# Patient Record
Sex: MALE | Race: BLACK OR AFRICAN AMERICAN | NOT HISPANIC OR LATINO | Employment: OTHER | URBAN - METROPOLITAN AREA
[De-identification: names, ages, dates, MRNs, and addresses within clinical notes are randomized per-mention and may not be internally consistent; named-entity substitution may affect disease eponyms.]

---

## 2018-01-15 ENCOUNTER — HOSPITAL ENCOUNTER (EMERGENCY)
Facility: OTHER | Age: 73
Discharge: HOME | End: 2018-01-15
Attending: EMERGENCY MEDICINE
Payer: MEDICARE

## 2018-01-15 VITALS
DIASTOLIC BLOOD PRESSURE: 89 MMHG | OXYGEN SATURATION: 99 % | TEMPERATURE: 98 F | WEIGHT: 165 LBS | RESPIRATION RATE: 18 BRPM | HEART RATE: 100 BPM | SYSTOLIC BLOOD PRESSURE: 142 MMHG | BODY MASS INDEX: 24.44 KG/M2 | HEIGHT: 69 IN

## 2018-01-15 DIAGNOSIS — L29.9 PRURITIC CONDITION: Primary | ICD-10-CM

## 2018-01-15 PROCEDURE — 99282 EMERGENCY DEPT VISIT SF MDM: CPT

## 2018-01-15 NOTE — ED PROVIDER NOTES
"Encounter Date: 1/15/2018       History     Chief Complaint   Patient presents with    Pruritis     intermittent x3 weeks, involves head and upper back     72-year-old male with hypertension and history of chronic neck and back pain presents emergency department with complaints of a "burning itching feeling from my head to my neck to my back when a cough."  He states that the symptoms occur intermittently for the last 3 weeks.  He denies rash, chest tightness, shortness breath, chest pain, numbness or weakness.  He states that the symptoms only occur with coughing and admits that it doesn't occur every time he coughs.  He states that they last for approximately 5 seconds.  No current treatment at home.  He does report that he had changed his pathologic but admits that he stopped using it is still experiencing the symptoms.      The history is provided by the patient.     Review of patient's allergies indicates:  No Known Allergies  Past Medical History:   Diagnosis Date    Back injury     Neck injury      No past surgical history on file.  No family history on file.  Social History   Substance Use Topics    Smoking status: Not on file    Smokeless tobacco: Not on file    Alcohol use Not on file     Review of Systems   Constitutional: Negative for chills and fever.        Burning, itching pain with cough   HENT: Negative for sore throat.    Respiratory: Negative for shortness of breath.    Cardiovascular: Negative for chest pain.   Gastrointestinal: Negative for nausea and vomiting.   Genitourinary: Negative for dysuria.   Musculoskeletal: Negative for arthralgias, back pain, gait problem, myalgias, neck pain and neck stiffness.   Skin: Negative for rash.   Neurological: Negative for dizziness, syncope, weakness, light-headedness, numbness and headaches.   Hematological: Does not bruise/bleed easily.       Physical Exam     Initial Vitals   BP Pulse Resp Temp SpO2   01/15/18 0841 01/15/18 0841 01/15/18 0841 " 01/15/18 0841 01/15/18 1011   (!) 168/91 110 16 98.2 °F (36.8 °C) 99 %      MAP       01/15/18 0841       116.67         Physical Exam    Nursing note and vitals reviewed.  Constitutional: He appears well-developed and well-nourished. He is not diaphoretic.  Non-toxic appearance. No distress.   HENT:   Head: Normocephalic and atraumatic.   Right Ear: External ear normal.   Left Ear: External ear normal.   Nose: Nose normal.   Mouth/Throat: Uvula is midline, oropharynx is clear and moist and mucous membranes are normal. Mucous membranes are not dry. He has dentures. No trismus in the jaw. No uvula swelling. No oropharyngeal exudate, posterior oropharyngeal edema, posterior oropharyngeal erythema or tonsillar abscesses.   Eyes: Conjunctivae, EOM and lids are normal. Pupils are equal, round, and reactive to light. No scleral icterus.   Neck: Normal range of motion and phonation normal. Neck supple.   Cardiovascular: Normal rate, regular rhythm, normal heart sounds, intact distal pulses and normal pulses. Exam reveals no gallop, no friction rub and no decreased pulses.    No murmur heard.  Pulmonary/Chest: Effort normal and breath sounds normal. No respiratory distress. He has no decreased breath sounds. He has no wheezes. He has no rhonchi. He has no rales.   Abdominal: Normal appearance.   Musculoskeletal: Normal range of motion.   No obvious deformities, moving all extremities, normal gait   Neurological: He is alert and oriented to person, place, and time. He has normal strength and normal reflexes. He displays no atrophy. No sensory deficit. He exhibits normal muscle tone. Coordination and gait normal. GCS eye subscore is 4. GCS verbal subscore is 5. GCS motor subscore is 6.   Skin: Skin is warm, dry and intact. Capillary refill takes less than 2 seconds. No lesion and no rash noted. No erythema.   Psychiatric: He has a normal mood and affect. His speech is normal and behavior is normal. Judgment normal. Cognition  and memory are normal.         ED Course   Procedures  Labs Reviewed - No data to display          Medical Decision Making:   History:   Old Medical Records: I decided to obtain old medical records.  Initial Assessment:   72-year-old male with complaints consistent with burning itching sensation after coughing.  Afebrile and neurovascularly intact.  He is alert, healthy and nontoxic appearing.  He is in no apparent distress.  No focal neurological deficits.  Exam is completely benign.  Unsure of why patient is having the symptoms.  He has no evidence of rash.  No evidence of anaphylaxis or severe ALLERGIC reaction.  No neuro deficits.  ED Management:  I do not feel that emergent workup is indicated.  He is to follow-up with his primary care physician the next 48 hours or return for any worsening symptoms.  He states understanding.  This patient was discussed with the attending physician who agrees with treatment plan.  Other:   I have discussed this case with another health care provider.       <> Summary of the Discussion: Cuong  This note was created using Dragon Medical dictation.  There may be typographical errors secondary to dictation.                     ED Course      Clinical Impression:     1. Pruritic condition          Disposition:   Disposition: Discharged  Condition: Stable                        Evie Matthew PA-C  01/15/18 1024

## 2018-01-15 NOTE — DISCHARGE INSTRUCTIONS
I do not feel that the symptoms  that you are explaining require emergent workup at this time. East follow-up with your primary care physician or return if symptoms persist for longer than 5 seconds after coughing, he developed a rash, fever, chest tightness, shortness breath, numbness or weakness

## 2024-01-11 ENCOUNTER — OFFICE VISIT (OUTPATIENT)
Dept: ORTHOPEDICS | Facility: CLINIC | Age: 79
End: 2024-01-11
Payer: MEDICARE

## 2024-01-11 VITALS — BODY MASS INDEX: 25.92 KG/M2 | HEIGHT: 69 IN | WEIGHT: 175 LBS

## 2024-01-11 DIAGNOSIS — M43.16 SPONDYLOLISTHESIS OF LUMBAR REGION: ICD-10-CM

## 2024-01-11 DIAGNOSIS — M47.816 FACET ARTHRITIS, DEGENERATIVE, LUMBAR SPINE: ICD-10-CM

## 2024-01-11 DIAGNOSIS — M51.36 DEGENERATION OF LUMBAR INTERVERTEBRAL DISC: ICD-10-CM

## 2024-01-11 DIAGNOSIS — M17.12 PRIMARY OSTEOARTHRITIS OF LEFT KNEE: Primary | ICD-10-CM

## 2024-01-11 PROCEDURE — 99203 OFFICE O/P NEW LOW 30 MIN: CPT | Mod: S$GLB,,, | Performed by: PHYSICIAN ASSISTANT

## 2024-01-11 RX ORDER — CARISOPRODOL 350 MG/1
350 TABLET ORAL 4 TIMES DAILY PRN
COMMUNITY

## 2024-01-11 RX ORDER — AMLODIPINE BESYLATE 10 MG/1
10 TABLET ORAL
COMMUNITY
Start: 2023-12-22

## 2024-01-11 NOTE — PROGRESS NOTES
[facility]  [address]  [place] [address]  Phone: [phone]   [phone]    Patient's PCP: Markell Alvarado MD  Referring Provider: No ref. provider found    Subjective:      Chief Complaint:   Chief Complaint   Patient presents with    Left Knee - Pain     Left knee pain x years, c/o pain, swelling, gives way, popping,     Lumbar Spine - Pain     Lumbar x 4-5 years, pain is off and on, no leg symptoms,        Past Medical History:   Diagnosis Date    Back injury     Neck injury        History reviewed. No pertinent surgical history.    Current Outpatient Medications   Medication Sig    amLODIPine (NORVASC) 10 MG tablet Take 10 mg by mouth.    amlodipine (NORVASC) 5 MG tablet Take 1 tablet (5 mg total) by mouth once daily.    carisoprodoL (SOMA) 350 MG tablet Take 350 mg by mouth 4 (four) times daily as needed for Muscle spasms.    hydrocodone-acetaminophen (VICODIN) 5-500 mg per tablet Take 1 tablet by mouth every 6 (six) hours as needed.    diazepam (VALIUM) 10 MG Tab Take 10 mg by mouth.     No current facility-administered medications for this visit.       Review of patient's allergies indicates:  No Known Allergies    History reviewed. No pertinent family history.    Social History     Socioeconomic History    Marital status:    Tobacco Use    Smoking status: Never    Smokeless tobacco: Never       Prior to meeting with the patient I reviewed the medical chart in [facility]. This included reviewing the previous progress notes from our office, review of the patient's last appointment with their primary care provider, review of any visits to the emergency room, and review of any pain management appointments or procedures.    History of present illness:  78-year-old male, presents to clinic today for evaluation of complaints of chronic left knee pain and chronic low back pain.  He is somewhat of a poor historian but he is got advanced osteoarthritis in the left knee, he has  been recommended for total left knee arthroplasty before in the past.  He has had intra-articular steroid injections and these are no longer efficacious.  In terms of the low back, he is got intervertebral disc disease, he is got some spondylosis and spondylolisthesis and facet arthritis.  Again he states that he is seen pain management he has had injections before in his spine and knees were not efficacious.    He is here today for refills of his Norco and Soma.      Review of Systems:    Constitutional: Negative for chills, fever and weight loss.   HENT: Negative for congestion.    Eyes: Negative for discharge and redness.   Respiratory: Negative for cough and shortness of breath.    Cardiovascular: Negative for chest pain.   Gastrointestinal: Negative for nausea and vomiting.   Musculoskeletal: See HPI.   Skin: Negative for rash.   Neurological: Negative for headaches.   Endo/Heme/Allergies: Does not bruise/bleed easily.   Psychiatric/Behavioral: The patient is not nervous/anxious.    All other systems reviewed and are negative.       Objective:      Physical Examination:    Vital Signs:  There were no vitals filed for this visit.    Body mass index is 25.84 kg/m².    This a well-developed, well nourished patient in no acute distress.  They are alert and oriented and cooperative to examination.     Examination of the left knee, skin is dry and intact, no erythema or ecchymosis, no signs symptoms of infection.  No effusion.  He does have crepitus patellofemoral joint.  No pain with patellofemoral grind testing.  Tender over the medial joint line.  Range of motion 0-100 degrees.  Stable to anterior-posterior varus and valgus stress.  Calf soft nontender, straight leg raise negative.    Examination lumbar spine, patient with lumbar sacral pain generalized tenderness to palpation no spasm no vertebral tenderness.  Bilateral straight leg raises are negative.  Denies groin pain with vigorous hip range of motion.   Calves are soft and nontender.    Pertinent New Results:        XRAY Report / Interpretation:   AP pelvis taken today in the office, no significant femoral acetabular arthrosis is noted.  Symmetrical preservation of the joint spaces.  Visualized soft tissues appear normal.    AP and lateral views lumbar spine taken today in the office, the patient has good preservation of the intervertebral disc spaces without evidence of significant disc space collapse mild disc space narrowing at L5-S1.  He does have some mild anterolisthesis of L4 on L5.  There is also significant facet arthrosis L3-S1.    AP lateral sunrise views left knee taken today in the office demonstrate advanced tricompartmental osteoarthritic changes.  Near bone-on-bone deformity of the medial compartment, similar appearance and osteophyte spurring in the patellofemoral compartment.      Assessment:       1. Primary osteoarthritis of left knee    2. Degeneration of lumbar intervertebral disc    3. Facet arthritis, degenerative, lumbar spine    4. Spondylolisthesis of lumbar region      Plan:     Primary osteoarthritis of left knee  -     X-Ray Knee 3 View Left    Degeneration of lumbar intervertebral disc  -     X-Ray Knee 3 View Left  -     X-Ray Lumbar Spine Ap And Lateral  -     X-Ray Pelvis Routine AP    Facet arthritis, degenerative, lumbar spine  -     X-Ray Lumbar Spine Ap And Lateral  -     X-Ray Pelvis Routine AP    Spondylolisthesis of lumbar region  -     X-Ray Lumbar Spine Ap And Lateral  -     X-Ray Pelvis Routine AP        Follow up if symptoms worsen or fail to improve.    78-year-old male referred to us by his primary care provider for evaluation of his chronic left knee pain.  Patient with advanced tricompartmental changes certainly would benefit from total knee arthroplasty.  I discussed intra-articular steroid injections as an initial treatment but he said he has had these before in the past they do not help.  He takes Norco, this does  help.  He has been without his Norco for 6 weeks.  He is interested in refills of his medication.    He also has chronic low back pain.  Primarily axial in nature without radicular symptoms.  X-rays demonstrate multilevel degenerative changes, he is got some mild intervertebral disc degeneration most notably at L5-S1 and some spondylolisthesis anterior listhesis of L4 on L5.  He is got significant facet arthritis L3-S1.  I think his axial back pain secondary to his facet arthritis.  We discussed starting with physical therapy for strengthening of the spine.  Then MRI and or pain management follow-up for possible trial of medial branch blocks and rhizotomy.  He states he has had these procedures before in the past.  They are not beneficial to him.  He is only interested in his pain medication refills and Soma refills at this time.  He states he is got information on total knee arthroplasty.  Him in his wife are considering it.  But that is not why he is here today.    We recommended that the patient follow back up with his primary care provider for medication management.  If he is only interested in narcotic pain medication he should follow up with the pain management provider for medication management.  We are happy to treat him conservatively with injections physical therapy or surgery.    [unfilled]  FRANCIS Rachel PA-C    This note was created using "[employer] voice recognition software that occasionally misinterprets words or phrases.

## 2024-01-17 ENCOUNTER — TELEPHONE (OUTPATIENT)
Dept: ORTHOPEDICS | Facility: CLINIC | Age: 79
End: 2024-01-17

## 2024-01-17 NOTE — TELEPHONE ENCOUNTER
Spoke to patient and advised of our medication protocol. Advised patient to reach out to his primary care or his pain management provider. Patient verbalized understanding

## 2024-01-17 NOTE — TELEPHONE ENCOUNTER
----- Message from Debra Scott sent at 1/17/2024  9:59 AM CST -----  Phone #: [phone]  Patient is requesting a refill of  hydrocodone-acetaminophen (VICODIN) 5-500 mg per tablet ,carisoprodoL (SOMA) 350 MG tablet                    Pharmacy:   [id number] - [place] - [address] [facility]  [address]  [place] [address]  Phone: [phone] Fax: [phone]